# Patient Record
Sex: FEMALE | Race: WHITE | NOT HISPANIC OR LATINO
[De-identification: names, ages, dates, MRNs, and addresses within clinical notes are randomized per-mention and may not be internally consistent; named-entity substitution may affect disease eponyms.]

---
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
## 2024-01-19 ENCOUNTER — WEB ENCOUNTER (OUTPATIENT)
Dept: URBAN - METROPOLITAN AREA CLINIC 96 | Facility: CLINIC | Age: 19
End: 2024-01-19

## 2024-03-18 ENCOUNTER — OV EP (OUTPATIENT)
Dept: URBAN - METROPOLITAN AREA CLINIC 96 | Facility: CLINIC | Age: 19
End: 2024-03-18
Payer: COMMERCIAL

## 2024-03-18 VITALS
HEART RATE: 98 BPM | SYSTOLIC BLOOD PRESSURE: 111 MMHG | WEIGHT: 146 LBS | BODY MASS INDEX: 25.87 KG/M2 | HEIGHT: 63 IN | TEMPERATURE: 97.7 F | DIASTOLIC BLOOD PRESSURE: 76 MMHG

## 2024-03-18 DIAGNOSIS — N30.00 ACUTE CYSTITIS WITHOUT HEMATURIA: ICD-10-CM

## 2024-03-18 DIAGNOSIS — K51.20 ULCERATIVE PROCTITIS WITHOUT COMPLICATION: ICD-10-CM

## 2024-03-18 PROCEDURE — 99214 OFFICE O/P EST MOD 30 MIN: CPT | Performed by: INTERNAL MEDICINE

## 2024-03-18 NOTE — HPI-TODAY'S VISIT:
19 yo. 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 Estuardo Saez Doesn't love the medicine Taking Canasa right before bedtime - don't like that it is a suppository - works well Used to have issues w/ Colazal - would have break through Infrequent abd pain Recently better at taking it - have gone a month w/o taking it No blood in stool No mucus in stool Once or twice / day Last month had UTI -- abx x 7 days -- wonders if has another

## 2024-06-29 ENCOUNTER — WEB ENCOUNTER (OUTPATIENT)
Dept: URBAN - METROPOLITAN AREA CLINIC 50 | Facility: CLINIC | Age: 19
End: 2024-06-29

## 2024-06-29 RX ORDER — PREDNISONE 20 MG/1
1 TABLET TABLET ORAL ONCE A DAY
Qty: 5 TABLET | Refills: 0 | OUTPATIENT
Start: 2024-06-30 | End: 2024-07-05

## 2024-06-29 RX ORDER — MESALAMINE 1000 MG/1
1 SUPPOSITORY AT BEDTIME SUPPOSITORY RECTAL ONCE A DAY
Qty: 30 | Refills: 1 | OUTPATIENT
Start: 2024-06-30 | End: 2024-08-29

## 2024-07-22 ENCOUNTER — DASHBOARD ENCOUNTERS (OUTPATIENT)
Age: 19
End: 2024-07-22

## 2024-07-22 ENCOUNTER — OFFICE VISIT (OUTPATIENT)
Dept: URBAN - METROPOLITAN AREA CLINIC 50 | Facility: CLINIC | Age: 19
End: 2024-07-22
Payer: COMMERCIAL

## 2024-07-22 VITALS
BODY MASS INDEX: 26.68 KG/M2 | HEIGHT: 63 IN | WEIGHT: 150.6 LBS | DIASTOLIC BLOOD PRESSURE: 75 MMHG | TEMPERATURE: 97.8 F | SYSTOLIC BLOOD PRESSURE: 109 MMHG | HEART RATE: 100 BPM

## 2024-07-22 DIAGNOSIS — K51.80 CHRONIC PANCOLONIC ULCERATIVE COLITIS: ICD-10-CM

## 2024-07-22 PROCEDURE — 99214 OFFICE O/P EST MOD 30 MIN: CPT | Performed by: PHYSICIAN ASSISTANT

## 2024-07-22 RX ORDER — MESALAMINE 1000 MG/1
1 SUPPOSITORY AT BEDTIME SUPPOSITORY RECTAL ONCE A DAY
Qty: 30 | Refills: 1 | Status: ON HOLD | COMMUNITY
Start: 2024-06-30 | End: 2024-08-29

## 2024-07-22 RX ORDER — MESALAMINE 1.2 G/1
2 TABLETS WITH A MEAL TABLET, DELAYED RELEASE ORAL ONCE A DAY
Qty: 180 TABLET | Refills: 3 | OUTPATIENT
Start: 2024-07-22

## 2024-07-22 NOTE — HPI-TODAY'S VISIT:
7/22/24: Pt is 20 y/o F here for f/u UC flare  Thought was experiencing a flare, messaged us Increased blood in stool, bowel frequency BMs 3x/day Some abd discomfort w syx Thought about starting prednisone, but was told per cardiology to defer given on fludrocoritsone already Restarted suppositories though - started feeling better relatively quickly and felt didn't need steroid anyways Now about wk 3 of suppositories, planning on a month of treatment Occ nausea, no vomiting, no abd pains Beleives nausea my be 2/2 new POTS dx BMs now qod, no blood/mucus/melena Feeling better now Going back to school early August - should we take cansasa to school? Doesn't prefer to use rectal meds for maintenance -- 3/18/24: 19 yo. 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 Estuardo Saez Doesn't love the medicine Taking Canasa right before bedtime - don't like that it is a suppository - works well Used to have issues w/ Colazal - would have break through Infrequent abd pain Recently better at taking it - have gone a month w/o taking it No blood in stool No mucus in stool Once or twice / day Last month had UTI -- abx x 7 days -- wonders if has another

## 2024-08-06 ENCOUNTER — WEB ENCOUNTER (OUTPATIENT)
Dept: URBAN - METROPOLITAN AREA CLINIC 50 | Facility: CLINIC | Age: 19
End: 2024-08-06

## 2024-08-06 ENCOUNTER — LAB OUTSIDE AN ENCOUNTER (OUTPATIENT)
Dept: URBAN - METROPOLITAN AREA CLINIC 50 | Facility: CLINIC | Age: 19
End: 2024-08-06

## 2024-08-06 RX ORDER — ONDANSETRON 4 MG/1
1 TABLET ON THE TONGUE AND ALLOW TO DISSOLVE TABLET, ORALLY DISINTEGRATING ORAL
Qty: 30 | Refills: 3 | OUTPATIENT
Start: 2024-08-06

## 2024-08-13 LAB — CALPROTECTIN, STOOL - QDX: (no result)

## 2024-12-27 ENCOUNTER — WEB ENCOUNTER (OUTPATIENT)
Dept: URBAN - METROPOLITAN AREA CLINIC 50 | Facility: CLINIC | Age: 19
End: 2024-12-27

## 2024-12-27 RX ORDER — PREDNISONE 20 MG/1
1 TABLET TABLET ORAL ONCE A DAY
Qty: 5 TABLET | Refills: 0
Start: 2024-06-30 | End: 2025-01-01

## 2024-12-27 RX ORDER — MESALAMINE 1000 MG/1
1 SUPPOSITORY AT BEDTIME SUPPOSITORY RECTAL ONCE A DAY
Qty: 30 | Refills: 1
Start: 2024-06-30 | End: 2025-02-25

## 2025-01-27 ENCOUNTER — OFFICE VISIT (OUTPATIENT)
Dept: URBAN - METROPOLITAN AREA TELEHEALTH 2 | Facility: TELEHEALTH | Age: 20
End: 2025-01-27
Payer: COMMERCIAL

## 2025-01-27 ENCOUNTER — TELEPHONE ENCOUNTER (OUTPATIENT)
Dept: URBAN - METROPOLITAN AREA CLINIC 50 | Facility: CLINIC | Age: 20
End: 2025-01-27

## 2025-01-27 VITALS — HEIGHT: 63 IN | BODY MASS INDEX: 26.58 KG/M2 | WEIGHT: 150 LBS

## 2025-01-27 DIAGNOSIS — K30 INDIGESTION: ICD-10-CM

## 2025-01-27 DIAGNOSIS — K51.20 ULCERATIVE PROCTITIS WITHOUT COMPLICATION: ICD-10-CM

## 2025-01-27 DIAGNOSIS — K92.1 BLOOD IN STOOL: ICD-10-CM

## 2025-01-27 PROBLEM — 162031009: Status: ACTIVE | Noted: 2025-01-27

## 2025-01-27 PROCEDURE — 99214 OFFICE O/P EST MOD 30 MIN: CPT | Performed by: PHYSICIAN ASSISTANT

## 2025-01-27 RX ORDER — MESALAMINE 1000 MG/1
1 SUPPOSITORY AT BEDTIME SUPPOSITORY RECTAL ONCE A DAY
Qty: 30 | Refills: 1 | Status: ACTIVE | COMMUNITY

## 2025-01-27 RX ORDER — MESALAMINE 1.2 G/1
2 TABLETS WITH A MEAL TABLET, DELAYED RELEASE ORAL ONCE A DAY
Qty: 180 TABLET | Refills: 3 | Status: ON HOLD | COMMUNITY

## 2025-01-27 NOTE — HPI-TODAY'S VISIT:
1/27/25: 20 y/o F here f/u UC Notes syx wosened in October 2024, increased cramping/discomfort in abdomen, brbpr  Had increaesd bowel frequency as well BMs 2-3x/day, unchanged whether on or off oral mesamaine so stopped as didn't seem to change much Messaged about a month ago, added back suppositories daily per Dr. Dye at that time which helped symptoms a lot  BMs 1x/1-2 days now on daily suppository Appetite good, no wt loss/vomiting, occ nausea/acid reflux stuff No urgency/cramping now on suppository, no blood w suppository use No rashes or joint pains Up to date on flu shot Up to date w dermatology -- 7/22/24: Pt is 20 y/o F here for f/u UC flare  Thought was experiencing a flare, messaged us Increased blood in stool, bowel frequency BMs 3x/day Some abd discomfort w syx Thought about starting prednisone, but was told per cardiology to defer given on fludrocoritsone already Restarted suppositories though - started feeling better relatively quickly and felt didn't need steroid anyways Now about wk 3 of suppositories, planning on a month of treatment Occ nausea, no vomiting, no abd pains Believes nausea my be 2/2 new POTS dx BMs now qod, no blood/mucus/melena Feeling better now Going back to school early August - should we take cansasa to school? Doesn't prefer to use rectal meds for maintenance -- 3/18/24: 19 yo. 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 Estuardo Saez Doesn't love the medicine Taking Canasa right before bedtime - don't like that it is a suppository - works well Used to have issues w/ Colazal - would have break through Infrequent abd pain Recently better at taking it - have gone a month w/o taking it No blood in stool No mucus in stool Once or twice / day Last month had UTI -- abx x 7 days -- wonders if has another

## 2025-06-04 ENCOUNTER — WEB ENCOUNTER (OUTPATIENT)
Dept: URBAN - METROPOLITAN AREA CLINIC 50 | Facility: CLINIC | Age: 20
End: 2025-06-04

## 2025-07-07 ENCOUNTER — TELEPHONE ENCOUNTER (OUTPATIENT)
Dept: URBAN - METROPOLITAN AREA CLINIC 50 | Facility: CLINIC | Age: 20
End: 2025-07-07

## 2025-07-11 ENCOUNTER — WEB ENCOUNTER (OUTPATIENT)
Dept: URBAN - METROPOLITAN AREA CLINIC 50 | Facility: CLINIC | Age: 20
End: 2025-07-11

## 2025-07-18 ENCOUNTER — OFFICE VISIT (OUTPATIENT)
Dept: URBAN - METROPOLITAN AREA CLINIC 50 | Facility: CLINIC | Age: 20
End: 2025-07-18
Payer: COMMERCIAL

## 2025-07-18 ENCOUNTER — OFFICE VISIT (OUTPATIENT)
Dept: URBAN - METROPOLITAN AREA CLINIC 50 | Facility: CLINIC | Age: 20
End: 2025-07-18

## 2025-07-18 DIAGNOSIS — R79.82 CRP ELEVATED: ICD-10-CM

## 2025-07-18 DIAGNOSIS — Z78.9 NOT IMMUNE TO HEPATITIS B VIRUS: ICD-10-CM

## 2025-07-18 DIAGNOSIS — E66.3 OVERWEIGHT: ICD-10-CM

## 2025-07-18 DIAGNOSIS — R79.0 LOW FERRITIN: ICD-10-CM

## 2025-07-18 DIAGNOSIS — K51.20 ULCERATIVE PROCTITIS WITHOUT COMPLICATION: ICD-10-CM

## 2025-07-18 PROBLEM — 238131007: Status: ACTIVE | Noted: 2025-07-18

## 2025-07-18 PROBLEM — 119971000119104: Status: ACTIVE | Noted: 2025-07-18

## 2025-07-18 PROCEDURE — 99214 OFFICE O/P EST MOD 30 MIN: CPT | Performed by: PHYSICIAN ASSISTANT

## 2025-07-18 RX ORDER — MESALAMINE 1.2 G/1
2 TABLETS WITH A MEAL TABLET, DELAYED RELEASE ORAL ONCE A DAY
Qty: 180 TABLET | Refills: 3 | Status: ON HOLD | COMMUNITY

## 2025-07-18 RX ORDER — MESALAMINE 1000 MG/1
1 SUPPOSITORY AT BEDTIME SUPPOSITORY RECTAL ONCE A DAY
Qty: 30 | Refills: 1 | Status: ACTIVE | COMMUNITY

## 2025-07-18 NOTE — HPI-TODAY'S VISIT:
7/18/25: 21 y/o UC f/u States overall has been feeling well - unsure why CRP was high BMs qd-qod, no blood/mucus/melena, no urgency/frequency, no nocturnal syx Occ joint pains, nothing specific, no rashes Appetite good, no abnormal wt loss Up to date w flu shot and derm - does annually Using mesalamine suppository about weekly - not necessarily syx w using this but just perdioc use for maintenance -- 1/27/25: 20 y/o F here f/u UC Notes syx wosened in October 2024, increased cramping/discomfort in abdomen, brbpr  Had increaesd bowel frequency as well BMs 2-3x/day, unchanged whether on or off oral mesamaine so stopped as didn't seem to change much Messaged about a month ago, added back suppositories daily per Dr. Dye at that time which helped symptoms a lot  BMs 1x/1-2 days now on daily suppository Appetite good, no wt loss/vomiting, occ nausea/acid reflux stuff No urgency/cramping now on suppository, no blood w suppository use No rashes or joint pains Up to date on flu shot Up to date w dermatology -- 7/22/24: Pt is 20 y/o F here for f/u UC flare  Thought was experiencing a flare, messaged us Increased blood in stool, bowel frequency BMs 3x/day Some abd discomfort w syx Thought about starting prednisone, but was told per cardiology to defer given on fludrocoritsone already Restarted suppositories though - started feeling better relatively quickly and felt didn't need steroid anyways Now about wk 3 of suppositories, planning on a month of treatment Occ nausea, no vomiting, no abd pains Believes nausea my be 2/2 new POTS dx BMs now qod, no blood/mucus/melena Feeling better now Going back to school early August - should we take cansasa to school? Doesn't prefer to use rectal meds for maintenance -- 3/18/24: 19 yo. 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 Estuardo Saez Doesn't love the medicine Taking Canasa right before bedtime - don't like that it is a suppository - works well Used to have issues w/ Colazal - would have break through Infrequent abd pain Recently better at taking it - have gone a month w/o taking it No blood in stool No mucus in stool Once or twice / day Last month had UTI -- abx x 7 days -- wonders if has another